# Patient Record
Sex: FEMALE | Race: BLACK OR AFRICAN AMERICAN | NOT HISPANIC OR LATINO | Employment: FULL TIME | URBAN - NONMETROPOLITAN AREA
[De-identification: names, ages, dates, MRNs, and addresses within clinical notes are randomized per-mention and may not be internally consistent; named-entity substitution may affect disease eponyms.]

---

## 2024-05-08 ENCOUNTER — HOSPITAL ENCOUNTER (OUTPATIENT)
Dept: RADIOLOGY | Facility: CLINIC | Age: 49
Discharge: HOME | End: 2024-05-08
Payer: COMMERCIAL

## 2024-05-08 DIAGNOSIS — R52 PAIN: ICD-10-CM

## 2024-05-08 PROCEDURE — 73030 X-RAY EXAM OF SHOULDER: CPT | Mod: LT

## 2024-05-28 ENCOUNTER — APPOINTMENT (OUTPATIENT)
Dept: CARDIOLOGY | Facility: HOSPITAL | Age: 49
End: 2024-05-28
Payer: COMMERCIAL

## 2024-05-28 ENCOUNTER — APPOINTMENT (OUTPATIENT)
Dept: RADIOLOGY | Facility: HOSPITAL | Age: 49
End: 2024-05-28
Payer: COMMERCIAL

## 2024-05-28 ENCOUNTER — HOSPITAL ENCOUNTER (OUTPATIENT)
Facility: HOSPITAL | Age: 49
Setting detail: OBSERVATION
Discharge: HOME | End: 2024-05-29
Attending: EMERGENCY MEDICINE | Admitting: INTERNAL MEDICINE
Payer: COMMERCIAL

## 2024-05-28 DIAGNOSIS — R42 DIZZINESS: Primary | ICD-10-CM

## 2024-05-28 DIAGNOSIS — H53.9 VISION DISTURBANCE: ICD-10-CM

## 2024-05-28 DIAGNOSIS — Z72.0 TOBACCO USE: ICD-10-CM

## 2024-05-28 DIAGNOSIS — R03.0 ELEVATED BLOOD PRESSURE READING: ICD-10-CM

## 2024-05-28 LAB
ALBUMIN SERPL BCP-MCNC: 4.2 G/DL (ref 3.4–5)
ALP SERPL-CCNC: 67 U/L (ref 33–110)
ALT SERPL W P-5'-P-CCNC: 10 U/L (ref 7–45)
ANION GAP SERPL CALC-SCNC: 17 MMOL/L (ref 10–20)
AST SERPL W P-5'-P-CCNC: 11 U/L (ref 9–39)
ATRIAL RATE: 106 BPM
BASOPHILS # BLD AUTO: 0.02 X10*3/UL (ref 0–0.1)
BASOPHILS NFR BLD AUTO: 0.2 %
BILIRUB SERPL-MCNC: 0.5 MG/DL (ref 0–1.2)
BUN SERPL-MCNC: 10 MG/DL (ref 6–23)
CALCIUM SERPL-MCNC: 9.3 MG/DL (ref 8.6–10.3)
CARDIAC TROPONIN I PNL SERPL HS: <3 NG/L (ref 0–13)
CHLORIDE SERPL-SCNC: 100 MMOL/L (ref 98–107)
CHOLEST SERPL-MCNC: 145 MG/DL (ref 0–199)
CHOLESTEROL/HDL RATIO: 3.3
CO2 SERPL-SCNC: 23 MMOL/L (ref 21–32)
CREAT SERPL-MCNC: 0.64 MG/DL (ref 0.5–1.05)
EGFRCR SERPLBLD CKD-EPI 2021: >90 ML/MIN/1.73M*2
EOSINOPHIL # BLD AUTO: 0.03 X10*3/UL (ref 0–0.7)
EOSINOPHIL NFR BLD AUTO: 0.3 %
ERYTHROCYTE [DISTWIDTH] IN BLOOD BY AUTOMATED COUNT: 12.1 % (ref 11.5–14.5)
FLUAV RNA RESP QL NAA+PROBE: NOT DETECTED
FLUBV RNA RESP QL NAA+PROBE: NOT DETECTED
GLUCOSE SERPL-MCNC: 111 MG/DL (ref 74–99)
HCT VFR BLD AUTO: 39.4 % (ref 36–46)
HDLC SERPL-MCNC: 44.3 MG/DL
HGB BLD-MCNC: 12.6 G/DL (ref 12–16)
IMM GRANULOCYTES # BLD AUTO: 0.04 X10*3/UL (ref 0–0.7)
IMM GRANULOCYTES NFR BLD AUTO: 0.5 % (ref 0–0.9)
LDLC SERPL CALC-MCNC: 87 MG/DL
LYMPHOCYTES # BLD AUTO: 2.51 X10*3/UL (ref 1.2–4.8)
LYMPHOCYTES NFR BLD AUTO: 29.1 %
MAGNESIUM SERPL-MCNC: 2 MG/DL (ref 1.6–2.4)
MCH RBC QN AUTO: 29.7 PG (ref 26–34)
MCHC RBC AUTO-ENTMCNC: 32 G/DL (ref 32–36)
MCV RBC AUTO: 93 FL (ref 80–100)
MONOCYTES # BLD AUTO: 0.97 X10*3/UL (ref 0.1–1)
MONOCYTES NFR BLD AUTO: 11.2 %
NEUTROPHILS # BLD AUTO: 5.06 X10*3/UL (ref 1.2–7.7)
NEUTROPHILS NFR BLD AUTO: 58.7 %
NON HDL CHOLESTEROL: 101 MG/DL (ref 0–149)
NRBC BLD-RTO: 0 /100 WBCS (ref 0–0)
P AXIS: 44 DEGREES
P OFFSET: 205 MS
P ONSET: 149 MS
PLATELET # BLD AUTO: 176 X10*3/UL (ref 150–450)
POTASSIUM SERPL-SCNC: 3 MMOL/L (ref 3.5–5.3)
PR INTERVAL: 142 MS
PROT SERPL-MCNC: 8 G/DL (ref 6.4–8.2)
Q ONSET: 220 MS
QRS COUNT: 17 BEATS
QRS DURATION: 80 MS
QT INTERVAL: 348 MS
QTC CALCULATION(BAZETT): 462 MS
QTC FREDERICIA: 420 MS
R AXIS: -3 DEGREES
RBC # BLD AUTO: 4.24 X10*6/UL (ref 4–5.2)
SARS-COV-2 RNA RESP QL NAA+PROBE: NOT DETECTED
SODIUM SERPL-SCNC: 137 MMOL/L (ref 136–145)
T AXIS: 34 DEGREES
T OFFSET: 394 MS
TRIGL SERPL-MCNC: 70 MG/DL (ref 0–149)
TSH SERPL-ACNC: 2.09 MIU/L (ref 0.44–3.98)
VENTRICULAR RATE: 106 BPM
VLDL: 14 MG/DL (ref 0–40)
WBC # BLD AUTO: 8.6 X10*3/UL (ref 4.4–11.3)

## 2024-05-28 PROCEDURE — 2500000001 HC RX 250 WO HCPCS SELF ADMINISTERED DRUGS (ALT 637 FOR MEDICARE OP): Performed by: PHYSICIAN ASSISTANT

## 2024-05-28 PROCEDURE — 84484 ASSAY OF TROPONIN QUANT: CPT

## 2024-05-28 PROCEDURE — 99285 EMERGENCY DEPT VISIT HI MDM: CPT

## 2024-05-28 PROCEDURE — 2500000004 HC RX 250 GENERAL PHARMACY W/ HCPCS (ALT 636 FOR OP/ED): Performed by: INTERNAL MEDICINE

## 2024-05-28 PROCEDURE — 80061 LIPID PANEL: CPT | Performed by: INTERNAL MEDICINE

## 2024-05-28 PROCEDURE — 85025 COMPLETE CBC W/AUTO DIFF WBC: CPT

## 2024-05-28 PROCEDURE — 87636 SARSCOV2 & INF A&B AMP PRB: CPT

## 2024-05-28 PROCEDURE — 2500000004 HC RX 250 GENERAL PHARMACY W/ HCPCS (ALT 636 FOR OP/ED)

## 2024-05-28 PROCEDURE — 36415 COLL VENOUS BLD VENIPUNCTURE: CPT

## 2024-05-28 PROCEDURE — G0378 HOSPITAL OBSERVATION PER HR: HCPCS

## 2024-05-28 PROCEDURE — 2500000005 HC RX 250 GENERAL PHARMACY W/O HCPCS

## 2024-05-28 PROCEDURE — 70450 CT HEAD/BRAIN W/O DYE: CPT

## 2024-05-28 PROCEDURE — 2500000001 HC RX 250 WO HCPCS SELF ADMINISTERED DRUGS (ALT 637 FOR MEDICARE OP)

## 2024-05-28 PROCEDURE — 2500000002 HC RX 250 W HCPCS SELF ADMINISTERED DRUGS (ALT 637 FOR MEDICARE OP, ALT 636 FOR OP/ED)

## 2024-05-28 PROCEDURE — 83735 ASSAY OF MAGNESIUM: CPT

## 2024-05-28 PROCEDURE — 70551 MRI BRAIN STEM W/O DYE: CPT

## 2024-05-28 PROCEDURE — 70551 MRI BRAIN STEM W/O DYE: CPT | Performed by: STUDENT IN AN ORGANIZED HEALTH CARE EDUCATION/TRAINING PROGRAM

## 2024-05-28 PROCEDURE — 93005 ELECTROCARDIOGRAM TRACING: CPT

## 2024-05-28 PROCEDURE — 84443 ASSAY THYROID STIM HORMONE: CPT | Performed by: INTERNAL MEDICINE

## 2024-05-28 PROCEDURE — 99222 1ST HOSP IP/OBS MODERATE 55: CPT | Performed by: INTERNAL MEDICINE

## 2024-05-28 PROCEDURE — 84075 ASSAY ALKALINE PHOSPHATASE: CPT

## 2024-05-28 PROCEDURE — 70450 CT HEAD/BRAIN W/O DYE: CPT | Performed by: RADIOLOGY

## 2024-05-28 PROCEDURE — 2500000001 HC RX 250 WO HCPCS SELF ADMINISTERED DRUGS (ALT 637 FOR MEDICARE OP): Performed by: INTERNAL MEDICINE

## 2024-05-28 RX ORDER — POLYETHYLENE GLYCOL 3350 17 G/17G
17 POWDER, FOR SOLUTION ORAL DAILY
Status: DISCONTINUED | OUTPATIENT
Start: 2024-05-28 | End: 2024-05-29 | Stop reason: HOSPADM

## 2024-05-28 RX ORDER — ONDANSETRON 4 MG/1
4 TABLET, ORALLY DISINTEGRATING ORAL ONCE
Status: COMPLETED | OUTPATIENT
Start: 2024-05-28 | End: 2024-05-28

## 2024-05-28 RX ORDER — NAPROXEN 500 MG/1
500 TABLET ORAL 2 TIMES DAILY PRN
COMMUNITY
End: 2024-05-29 | Stop reason: HOSPADM

## 2024-05-28 RX ORDER — POTASSIUM CHLORIDE 20 MEQ/1
40 TABLET, EXTENDED RELEASE ORAL ONCE
Status: DISCONTINUED | OUTPATIENT
Start: 2024-05-28 | End: 2024-05-28

## 2024-05-28 RX ORDER — ACETAMINOPHEN 325 MG/1
975 TABLET ORAL EVERY 8 HOURS PRN
Status: DISCONTINUED | OUTPATIENT
Start: 2024-05-28 | End: 2024-05-29 | Stop reason: HOSPADM

## 2024-05-28 RX ORDER — SODIUM CHLORIDE, SODIUM LACTATE, POTASSIUM CHLORIDE, CALCIUM CHLORIDE 600; 310; 30; 20 MG/100ML; MG/100ML; MG/100ML; MG/100ML
125 INJECTION, SOLUTION INTRAVENOUS CONTINUOUS
Status: DISCONTINUED | OUTPATIENT
Start: 2024-05-28 | End: 2024-05-28

## 2024-05-28 RX ORDER — ONDANSETRON 4 MG/1
4 TABLET, FILM COATED ORAL EVERY 8 HOURS PRN
Status: DISCONTINUED | OUTPATIENT
Start: 2024-05-28 | End: 2024-05-29 | Stop reason: HOSPADM

## 2024-05-28 RX ORDER — ACETAMINOPHEN 160 MG/5ML
650 SOLUTION ORAL EVERY 6 HOURS PRN
Status: DISCONTINUED | OUTPATIENT
Start: 2024-05-28 | End: 2024-05-29 | Stop reason: HOSPADM

## 2024-05-28 RX ORDER — POTASSIUM CHLORIDE 20 MEQ/1
40 TABLET, EXTENDED RELEASE ORAL ONCE
Status: COMPLETED | OUTPATIENT
Start: 2024-05-28 | End: 2024-05-28

## 2024-05-28 RX ORDER — ONDANSETRON HYDROCHLORIDE 2 MG/ML
4 INJECTION, SOLUTION INTRAVENOUS EVERY 8 HOURS PRN
Status: DISCONTINUED | OUTPATIENT
Start: 2024-05-28 | End: 2024-05-29 | Stop reason: HOSPADM

## 2024-05-28 RX ORDER — ACETAMINOPHEN 650 MG/1
650 SUPPOSITORY RECTAL EVERY 4 HOURS PRN
Status: DISCONTINUED | OUTPATIENT
Start: 2024-05-28 | End: 2024-05-29 | Stop reason: HOSPADM

## 2024-05-28 RX ORDER — POTASSIUM CHLORIDE 1.5 G/1.58G
40 POWDER, FOR SOLUTION ORAL ONCE
Status: COMPLETED | OUTPATIENT
Start: 2024-05-28 | End: 2024-05-28

## 2024-05-28 RX ORDER — TALC
6 POWDER (GRAM) TOPICAL NIGHTLY
Status: DISCONTINUED | OUTPATIENT
Start: 2024-05-28 | End: 2024-05-29 | Stop reason: HOSPADM

## 2024-05-28 RX ORDER — MECLIZINE HYDROCHLORIDE 25 MG/1
25 TABLET ORAL ONCE
Status: COMPLETED | OUTPATIENT
Start: 2024-05-28 | End: 2024-05-28

## 2024-05-28 RX ADMIN — SODIUM CHLORIDE 1000 ML: 9 INJECTION, SOLUTION INTRAVENOUS at 17:22

## 2024-05-28 RX ADMIN — SODIUM CHLORIDE 1000 ML: 9 INJECTION, SOLUTION INTRAVENOUS at 09:19

## 2024-05-28 RX ADMIN — POTASSIUM CHLORIDE 40 MEQ: 1500 TABLET, EXTENDED RELEASE ORAL at 09:21

## 2024-05-28 RX ADMIN — POTASSIUM CHLORIDE 40 MEQ: 1.5 POWDER, FOR SOLUTION ORAL at 21:24

## 2024-05-28 RX ADMIN — Medication 6 MG: at 20:37

## 2024-05-28 RX ADMIN — ONDANSETRON 4 MG: 4 TABLET, ORALLY DISINTEGRATING ORAL at 10:46

## 2024-05-28 RX ADMIN — MECLIZINE HYDROCHLORIDE 25 MG: 25 TABLET ORAL at 09:21

## 2024-05-28 RX ADMIN — ACETAMINOPHEN 975 MG: 325 TABLET ORAL at 20:36

## 2024-05-28 SDOH — SOCIAL STABILITY: SOCIAL INSECURITY: DOES ANYONE TRY TO KEEP YOU FROM HAVING/CONTACTING OTHER FRIENDS OR DOING THINGS OUTSIDE YOUR HOME?: NO

## 2024-05-28 SDOH — SOCIAL STABILITY: SOCIAL INSECURITY: HAS ANYONE EVER THREATENED TO HURT YOUR FAMILY OR YOUR PETS?: NO

## 2024-05-28 SDOH — SOCIAL STABILITY: SOCIAL INSECURITY: ABUSE: ADULT

## 2024-05-28 SDOH — SOCIAL STABILITY: SOCIAL INSECURITY: ARE THERE ANY APPARENT SIGNS OF INJURIES/BEHAVIORS THAT COULD BE RELATED TO ABUSE/NEGLECT?: NO

## 2024-05-28 SDOH — SOCIAL STABILITY: SOCIAL INSECURITY: HAVE YOU HAD THOUGHTS OF HARMING ANYONE ELSE?: NO

## 2024-05-28 SDOH — SOCIAL STABILITY: SOCIAL INSECURITY: WERE YOU ABLE TO COMPLETE ALL THE BEHAVIORAL HEALTH SCREENINGS?: YES

## 2024-05-28 SDOH — SOCIAL STABILITY: SOCIAL INSECURITY: ARE YOU OR HAVE YOU BEEN THREATENED OR ABUSED PHYSICALLY, EMOTIONALLY, OR SEXUALLY BY ANYONE?: NO

## 2024-05-28 SDOH — SOCIAL STABILITY: SOCIAL INSECURITY: DO YOU FEEL UNSAFE GOING BACK TO THE PLACE WHERE YOU ARE LIVING?: NO

## 2024-05-28 SDOH — SOCIAL STABILITY: SOCIAL INSECURITY: HAVE YOU HAD ANY THOUGHTS OF HARMING ANYONE ELSE?: NO

## 2024-05-28 SDOH — SOCIAL STABILITY: SOCIAL INSECURITY: DO YOU FEEL ANYONE HAS EXPLOITED OR TAKEN ADVANTAGE OF YOU FINANCIALLY OR OF YOUR PERSONAL PROPERTY?: NO

## 2024-05-28 ASSESSMENT — COGNITIVE AND FUNCTIONAL STATUS - GENERAL
DAILY ACTIVITIY SCORE: 24
MOBILITY SCORE: 24
PATIENT BASELINE BEDBOUND: NO

## 2024-05-28 ASSESSMENT — ENCOUNTER SYMPTOMS
PSYCHIATRIC NEGATIVE: 1
DIZZINESS: 1
VOMITING: 1
MUSCULOSKELETAL NEGATIVE: 1
NAUSEA: 1
RESPIRATORY NEGATIVE: 1
APPETITE CHANGE: 1
CARDIOVASCULAR NEGATIVE: 1

## 2024-05-28 ASSESSMENT — LIFESTYLE VARIABLES
HOW OFTEN DO YOU HAVE 6 OR MORE DRINKS ON ONE OCCASION: NEVER
SKIP TO QUESTIONS 9-10: 1
AUDIT-C TOTAL SCORE: 0
AUDIT-C TOTAL SCORE: 0
HOW OFTEN DO YOU HAVE A DRINK CONTAINING ALCOHOL: NEVER
PRESCIPTION_ABUSE_PAST_12_MONTHS: NO
SUBSTANCE_ABUSE_PAST_12_MONTHS: NO
HOW MANY STANDARD DRINKS CONTAINING ALCOHOL DO YOU HAVE ON A TYPICAL DAY: PATIENT DOES NOT DRINK

## 2024-05-28 ASSESSMENT — PATIENT HEALTH QUESTIONNAIRE - PHQ9
1. LITTLE INTEREST OR PLEASURE IN DOING THINGS: NOT AT ALL
2. FEELING DOWN, DEPRESSED OR HOPELESS: NOT AT ALL
SUM OF ALL RESPONSES TO PHQ9 QUESTIONS 1 & 2: 0

## 2024-05-28 ASSESSMENT — ACTIVITIES OF DAILY LIVING (ADL)
JUDGMENT_ADEQUATE_SAFELY_COMPLETE_DAILY_ACTIVITIES: YES
TOILETING: INDEPENDENT
ADEQUATE_TO_COMPLETE_ADL: YES
HEARING - LEFT EAR: FUNCTIONAL
GROOMING: INDEPENDENT
HEARING - RIGHT EAR: FUNCTIONAL
DRESSING YOURSELF: INDEPENDENT
PATIENT'S MEMORY ADEQUATE TO SAFELY COMPLETE DAILY ACTIVITIES?: YES
BATHING: INDEPENDENT
LACK_OF_TRANSPORTATION: NO
FEEDING YOURSELF: INDEPENDENT
WALKS IN HOME: INDEPENDENT

## 2024-05-28 ASSESSMENT — COLUMBIA-SUICIDE SEVERITY RATING SCALE - C-SSRS
6. HAVE YOU EVER DONE ANYTHING, STARTED TO DO ANYTHING, OR PREPARED TO DO ANYTHING TO END YOUR LIFE?: NO
1. IN THE PAST MONTH, HAVE YOU WISHED YOU WERE DEAD OR WISHED YOU COULD GO TO SLEEP AND NOT WAKE UP?: NO
2. HAVE YOU ACTUALLY HAD ANY THOUGHTS OF KILLING YOURSELF?: NO

## 2024-05-28 ASSESSMENT — PAIN - FUNCTIONAL ASSESSMENT: PAIN_FUNCTIONAL_ASSESSMENT: 0-10

## 2024-05-28 ASSESSMENT — PAIN SCALES - GENERAL
PAINLEVEL_OUTOF10: 0 - NO PAIN
PAINLEVEL_OUTOF10: 0 - NO PAIN

## 2024-05-28 NOTE — PROGRESS NOTES
Home with       05/28/24 5714   Current Planned Discharge Disposition   Current Planned Discharge Disposition Home

## 2024-05-28 NOTE — PROGRESS NOTES
Pharmacy Medication History Review    Per patient. No regular meds. Had Naproxen left over from shoulder pain. Took that .     Dayana Palacio is a 49 y.o. female admitted for No Principal Problem: There is no principal problem currently on the Problem List. Please update the Problem List and refresh.. Pharmacy reviewed the patient's [id number] medications and allergies for accuracy.    The list below reflectives the updated PTA list. Please review each medication in order reconciliation for additional clarification and justification.       The list below reflectives the updated allergy list. Please review each documented allergy for additional clarification and justification.  Allergies  Reviewed by Roman Kaur RN on 5/28/2024   No Known Allergies         Below are additional concerns with the patient's PTA list.  Prior to Admission Medications   Prescriptions Last Dose Informant   naproxen (Naprosyn) 500 mg tablet 5/24/2024    Sig: Take 1 tablet (500 mg) by mouth 2 times a day as needed for mild pain (1 - 3).      Facility-Administered Medications: None        Lindsey Allison

## 2024-05-28 NOTE — PROGRESS NOTES
Transitional Care Coordination Progress Note:  Plan per Medical/Surgical team: treatment of dizziness with antivert & IV fluids  Status: ED  Payor source: [place]   Discharge disposition: Home with    Potential Barriers: blurry vision right eye, left shoulder pain   ADOD: 5/29/2024   ANIL Mills RN, BSN Transitional Care Coordinator ED# [phone]      05/28/24 1444   Discharge Planning   Living Arrangements Spouse/significant other   Support Systems Spouse/significant other;Parent   Assistance Needed antivert   Type of Residence Private residence   Number of Stairs to Enter Residence 1   Number of Stairs Within Residence 0   Home or Post Acute Services None   Patient expects to be discharged to: [facility] [phone].   Does the patient need discharge transport arranged? No   Financial Resource Strain   How hard is it for you to pay for the very basics like food, housing, medical care, and heating? Not hard   Housing Stability   In the last 12 months, was there a time when you were not able to pay the mortgage or rent on time? N   In the last 12 months, how many places have you lived? 1   In the last 12 months, was there a time when you did not have a steady place to sleep or slept in a shelter (including now)? N   Transportation Needs   In the past 12 months, has lack of transportation kept you from medical appointments or from getting medications? no   In the past 12 months, has lack of transportation kept you from meetings, work, or from getting things needed for daily living? No

## 2024-05-28 NOTE — ED PROVIDER NOTES
HPI   Chief Complaint   Patient presents with    Dizziness     Started yesterday, slept most of the day, patient also has arm injury to left shoulder       49-year-old female no significant past medical history presents the ED today with a chief concern of dizziness.  Patient states symptoms started 3 days ago.  States that she woke up with dizziness.  She states that when she stands and walks around she physic she is off balance.  She states that she does not feel that she is off balance when she turns her head in certain directions or when she is sitting.  She states that she only feels off balance when she is walking around.  She denies any lightheadedness.  Denies syncope.  She states that she has a headache located on the right side of her head that is intermittent and sharp.  Denies exacerbating relieving factors.  She states that she also has blurry vision in her right eye more than her left.  She has never experienced the symptoms the past and states that she normally has 20/20 vision.  She states that she has had about 4-5 episodes of nonbloody nonbilious vomiting over the past couple days.  She denies any diarrhea.  She states that yesterday she slept all day.  Denies nasal congestion, cough, rhinorrhea, or sore throat.  She denies any neck pain.  Denies syncope.  The headache is not the worst headache of her life.  The headache was not thunderclap in onset.  She denies any fever/chills.  Denies chest pain or shortness of breath.  Denies numbness or tingling or weakness.  She has not taken any medications for symptoms.  She has no other symptoms or concerns at this time.      History provided by:  Patient   used: No                        No data recorded                   Patient History   History reviewed. No pertinent past medical history.  History reviewed. No pertinent surgical history.  No family history on file.  Social History     Tobacco Use    Smoking status: Not on file     Smokeless tobacco: Not on file   Substance Use Topics    Alcohol use: Not on file    Drug use: Not on file       Physical Exam   ED Triage Vitals [05/28/24 0801]   Temperature Heart Rate Respirations BP   37 °C (98.6 °F) 100 16 143/85      Pulse Ox Temp src Heart Rate Source Patient Position   98 % -- -- --      BP Location FiO2 (%)     Left arm --       Physical Exam  Constitutional: Vital signs per nursing notes.  Well developed, well nourished.  No acute distress.    Psychiatric: alert and oriented to person, place, and time; no abnormalities of mood or affect; memory intact  Eyes: PERRL; conjunctivae and lids normal; EOMI  ENT: Ears normal externally; face symmetric. voice normal  Neck: neck supple, no meningismus; trachea midline without deviation  Respiratory: normal respiratory effort and excursion; no rales, rhonchi, or wheezes; equal air entry  Cardiovascular: RRR, 2+ pulses extremities   Neurological: normal speech; CN II-XII grossly intact, normal motor and sensory function; no nystagmus; NIH 0.  Negative test of skew.  Patient states that she feels unsteady when she walks.  GI: no distention, soft, nontender  : Deferred  Musculoskeletal: normal gait and station; normal digits and nails; normal to palpation; normal strength/tone; neurovascular status intact.  Skin: normal to inspection; normal to palpation; no rash  ED Course & MDM   ED Course as of 05/28/24 1549   Tue May 28, 2024   0987 FINDINGS:  INTRACRANIAL:  The ventricular system is symmetrical and nondilated.  No mass or mass effect is identified.  There is no hemorrhage or subdural fluid collection.  There is no acute infarct.          EXTRACRANIAL:  Visualized paranasal sinuses and mastoids are clear.      IMPRESSION:  No acute intracranial pathology.      MACRO:  None      Signed by: Maura Nunez 5/28/2024 8:56 AM  Dictation workstation:   [id number]   []   5292 CBC shows no evidence of leukocytosis or anemia.  CMP shows no LILLIE or acute  liver injury.  Potassium is 3.0 so patient was given 40 mill colons of potassium in the ED.  Glucose is 111.  Magnesium and troponin normal. []   0954 Ventricular rate 106 bpm.  NH interval 142 ms.  QRS duration 80 ms.  QT/QTc 348/462 milliseconds.  Patient is in sinus tachycardia and ventricular rate of 106 bpm.  Normal axis.  There is good R wave progression.  No right or left bundle-branch block.  No ST elevations.  There is T wave inversion noted in aVR, V1.  No previous EKG to compare this to. []   1026 COVID, influenza negative []      ED Course User Index  [MC] Mark Mujica PA-C         Diagnoses as of 05/28/24 1549   Dizziness   Elevated blood pressure reading       Medical Decision Making  49-year-old female presents the ED today with a chief concern of dizziness.  She has a ride home today will not be driving her self home.  Vital signs reassuring.  Patient overall appears well and is nontoxic-appearing.  She was given IV fluids and meclizine in the ED. patient has full range of motion of the neck without any meningismus.  Satting well on room air.  Not hypoxic.  Not tachycardic.  Afebrile.  She was also given Zofran in the ED.  She felt a little better after administration of medications however her symptoms are not completely resolved.  She is fully neurologically intact with no acute neurological deficits.  She does tell me she feels unsteady when she is walking.  Her NIH is 0.  Negative test of skew.  Her labs are overall reassuring. Her potassium was 3.0 shows she was given 40 mill colons of potassium in the ED.  Troponin normal and EKG shows no ischemic changes.  Her head CT is normal without any acute intracranial pathology.  Would like to admit for MRI.  Discussed with Dr. Carrillo who says admission of this patient.  Patient will be admitted to the observation unit for further workup and treatment.    Differential diagnosis: Stroke, cardiac etiology, posterior circulation stroke,  dehydration, peripheral vertigo, central vertigo    Disposition/treatment  1.  Dizziness  2.  Elevated blood pressure reading    Shared decision-making was used patient feels comfortable being admitted to the observation unit.  Patient was accepted by Dr. Carrillo        Procedure  Procedures     Mark Mujica PA-C  05/28/24 6656       Mark Mujica PA-C  05/28/24 3686

## 2024-05-28 NOTE — PROGRESS NOTES
05/28/24 1443   [facility] Disability Status   Are you deaf or do you have serious difficulty hearing? N   Are you blind or do you have serious difficulty seeing, even when wearing glasses? N   Because of a physical, mental, or emotional condition, do you have serious difficulty concentrating, remembering, or making decisions? (5 years old or older) N   Do you have serious difficulty walking or climbing stairs? N   Do you have serious difficulty dressing or bathing? N   Because of a physical, mental, or emotional condition, do you have serious difficulty doing errands alone such as visiting the doctor? N

## 2024-05-29 ENCOUNTER — APPOINTMENT (OUTPATIENT)
Dept: RADIOLOGY | Facility: HOSPITAL | Age: 49
End: 2024-05-29
Payer: COMMERCIAL

## 2024-05-29 VITALS
OXYGEN SATURATION: 99 % | HEIGHT: 64 IN | WEIGHT: 180 LBS | TEMPERATURE: 99.9 F | SYSTOLIC BLOOD PRESSURE: 144 MMHG | HEART RATE: 89 BPM | BODY MASS INDEX: 30.73 KG/M2 | RESPIRATION RATE: 17 BRPM | DIASTOLIC BLOOD PRESSURE: 81 MMHG

## 2024-05-29 PROCEDURE — 99239 HOSP IP/OBS DSCHRG MGMT >30: CPT | Performed by: INTERNAL MEDICINE

## 2024-05-29 PROCEDURE — 72146 MRI CHEST SPINE W/O DYE: CPT | Performed by: RADIOLOGY

## 2024-05-29 PROCEDURE — 96360 HYDRATION IV INFUSION INIT: CPT | Mod: 59

## 2024-05-29 PROCEDURE — 72146 MRI CHEST SPINE W/O DYE: CPT

## 2024-05-29 PROCEDURE — 96361 HYDRATE IV INFUSION ADD-ON: CPT

## 2024-05-29 PROCEDURE — G0378 HOSPITAL OBSERVATION PER HR: HCPCS

## 2024-05-29 PROCEDURE — 72148 MRI LUMBAR SPINE W/O DYE: CPT | Performed by: RADIOLOGY

## 2024-05-29 PROCEDURE — 99222 1ST HOSP IP/OBS MODERATE 55: CPT | Performed by: STUDENT IN AN ORGANIZED HEALTH CARE EDUCATION/TRAINING PROGRAM

## 2024-05-29 PROCEDURE — 72148 MRI LUMBAR SPINE W/O DYE: CPT

## 2024-05-29 PROCEDURE — 2500000004 HC RX 250 GENERAL PHARMACY W/ HCPCS (ALT 636 FOR OP/ED): Performed by: INTERNAL MEDICINE

## 2024-05-29 RX ORDER — IBUPROFEN 200 MG
1 TABLET ORAL EVERY 24 HOURS
Qty: 30 PATCH | Refills: 0 | Status: SHIPPED | OUTPATIENT
Start: 2024-05-29 | End: 2024-06-28

## 2024-05-29 RX ADMIN — SODIUM CHLORIDE 500 ML: 9 INJECTION, SOLUTION INTRAVENOUS at 14:35

## 2024-05-29 ASSESSMENT — COGNITIVE AND FUNCTIONAL STATUS - GENERAL
DAILY ACTIVITIY SCORE: 24
MOBILITY SCORE: 24

## 2024-05-29 ASSESSMENT — PAIN SCALES - GENERAL
PAINLEVEL_OUTOF10: 0 - NO PAIN

## 2024-05-29 ASSESSMENT — PAIN - FUNCTIONAL ASSESSMENT
PAIN_FUNCTIONAL_ASSESSMENT: 0-10

## 2024-05-29 NOTE — H&P
"History Of Present Illness  Dayana Palacio is a 49 y.o. female presenting with \"blurry vision.\"  History reviewed. No pertinent past medical history.    Symptoms began the past few days over the holiday weekend.  says she doesn't drink enough and sometimes she gets these episodes. She says she feels \"dizzy,\" which seems to be how she describes vision changes she is having. She says her vision has become blurry, although it seems I was able to simulate these sensations when I checked her ocular movements. Also, when she walks she veers/staggers to right side. I asked if she feels faint or that room is spinning, but she does not think this really describes her symptoms. Has had some anorexia and N/V. She felt a bit better after getting some IV fluids in ED, but still with the symptoms. She has not smoked in several days.     History reviewed. No pertinent surgical history.  Social History     Tobacco Use    Smoking status: Every Day     Types: Cigarettes     Passive exposure: Never    Smokeless tobacco: Never   Vaping Use    Vaping status: Never Used   Substance Use Topics    Alcohol use: Never    Drug use: Never     No family history on file.  Allergies  Patient has no known allergies.    Review of Systems   Constitutional:  Positive for appetite change.   HENT: Negative.     Eyes:  Positive for visual disturbance.   Respiratory: Negative.     Cardiovascular: Negative.    Gastrointestinal:  Positive for nausea and vomiting.   Genitourinary: Negative.    Musculoskeletal: Negative.    Skin: Negative.    Neurological:  Positive for dizziness.   Psychiatric/Behavioral: Negative.         Last Recorded Vitals  Blood pressure 110/55, pulse 89, temperature 37 °C (98.6 °F), temperature source Temporal, resp. rate 17, height 1.626 m (5' 4\"), weight 81.6 kg (180 lb), SpO2 99%.  Physical Exam  Cardiovascular:      Rate and Rhythm: Normal rate and regular rhythm.      Heart sounds: Normal heart sounds.   Pulmonary:      " Breath sounds: Normal breath sounds.   Abdominal:      General: Bowel sounds are normal.      Palpations: Abdomen is soft.   Musculoskeletal:         General: Normal range of motion.   Neurological:      General: No focal deficit present.      Mental Status: She is alert and oriented to person, place, and time.      Cranial Nerves: Cranial nerves 2-12 are intact.      Sensory: Sensation is intact.      Motor: Motor function is intact.      Comments: Had some eye deviation (of both eyes) during EOM exam   Psychiatric:         Mood and Affect: Mood normal.           Relevant Results           Assessment/Plan   Principal Problem:    Vision disturbance  Active Problems:    Dizziness    Tobacco use  - not clear what to make of her story and exam; would like neuro to do better EOM exam on her to determine what further work-up is needed (MRI brain?)  - does not sound like vertigo or presyncope         I spent 50 minutes in the professional and overall care of this patient.      Holland Carrillo MD

## 2024-05-29 NOTE — CARE PLAN
The patient's goals for the shift include  remain free from injury and HDS    The clinical goals for the shift include Pt will remain HDS      Problem: Pain - Adult  Goal: Verbalizes/displays adequate comfort level or baseline comfort level  Outcome: Progressing     Problem: Safety - Adult  Goal: Free from fall injury  Outcome: Progressing     Problem: Discharge Planning  Goal: Discharge to home or other facility with appropriate resources  Outcome: Progressing     Problem: Chronic Conditions and Co-morbidities  Goal: Patient's chronic conditions and co-morbidity symptoms are monitored and maintained or improved  Outcome: Progressing

## 2024-05-29 NOTE — PROGRESS NOTES
Care Coordinator Note:    Plan: Patient in with dizziness. Noted to have abnl eye movement and veering to Left when ambulating, neuro consult pending. MRI brain completed.     Status: OBS  Payor: [place]  Disposition: Home with   Barrier: Neuro consult and future testing  ADOD: later today vs tomorrow    Padmini Barrett [facility]      05/29/24 1048   Discharge Planning   Patient expects to be discharged to: Home with

## 2024-05-29 NOTE — CARE PLAN
The patient's goals for the shift include      The clinical goals for the shift include Pt will remain HDS      Problem: Pain - Adult  Goal: Verbalizes/displays adequate comfort level or baseline comfort level  Outcome: Progressing     Problem: Safety - Adult  Goal: Free from fall injury  Outcome: Progressing     Problem: Discharge Planning  Goal: Discharge to home or other facility with appropriate resources  Outcome: Progressing     Problem: Chronic Conditions and Co-morbidities  Goal: Patient's chronic conditions and co-morbidity symptoms are monitored and maintained or improved  Outcome: Progressing

## 2024-05-30 DIAGNOSIS — Q04.8 CEREBELLAR TONSILLAR ECTOPIA (MULTI): Primary | ICD-10-CM

## 2024-05-30 NOTE — SIGNIFICANT EVENT
Neurosurgery reviewed MRI brain. There is a mild chiari malformation. MRI T/L spine obtained with views of cervical spine without a syrinx. Will arrange for an outpatient MRI CINE flow study and follow-up with Dr. Ritchie. Recommend outpatient ophtho evaluation.

## 2024-05-30 NOTE — DISCHARGE SUMMARY
"Admitting Provider: Holland Carrillo MD  Discharge Provider: Holland Carrillo MD  Primary Care Physician at Discharge: Anuja Dunham [phone]   Admission Date: 5/28/2024     Discharge Date: 5/29/2024  Current Planned Discharge Disposition: Home    Discharge Diagnoses  Principal Problem:    Vision disturbance  Active Problems:    Dizziness    Tobacco use      Hospital Course  Dayana Palacio is a 49 y.o. female presenting with \"blurry vision.\"     Symptoms began the past few days over the holiday weekend.  says she doesn't drink enough and sometimes she gets these episodes. She says she feels \"dizzy,\" which seems to be how she describes vision changes she is having. She says her vision has become blurry, although it seems I was able to simulate these sensations when I checked her ocular movements. Also, when she walks she veers/staggers to right side. I asked if she feels faint or that room is spinning, but she does not think this really describes her symptoms. Has had some anorexia and N/V. She felt a bit better after getting some IV fluids in ED, but still with the symptoms. She has not smoked in several days.     Seen by neuro, who felt she had normal neuro exam. MRI brain without stroke; does show mild Chiari malformation. Discussed with NSGY, who recommended MRI T and L spine; these did not show any concerning findings. She will follow up with NSGY and optho. Ultimately, cause of her presentation is not clear, but she did feel better on day of discharge.     Test Results Pending At Discharge  Pending Labs       No current pending labs.            Pertinent Physical Exam At Time of Discharge  /81 (BP Location: Left arm, Patient Position: Lying)   Pulse 89   Temp 37.7 °C (99.9 °F) (Temporal)   Resp 17   Ht 1.626 m (5' 4\")   Wt 81.6 kg (180 lb)   SpO2 99%   BMI 30.90 kg/m²   Physical Exam  Cardiovascular:      Rate and Rhythm: Normal rate and regular rhythm.      Heart sounds: " Normal heart sounds.   Pulmonary:      Breath sounds: Normal breath sounds.   Abdominal:      General: Bowel sounds are normal.      Palpations: Abdomen is soft.   Musculoskeletal:         General: Normal range of motion.   Neurological:      General: No focal deficit present.      Mental Status: She is alert and oriented to person, place, and time.   Psychiatric:         Mood and Affect: Mood normal.         Home Medications     Medication List      START taking these medications     nicotine 14 mg/24 hr patch; Commonly known as: Nicoderm CQ; Place 1   patch over 24 hours on the skin once every 24 hours.     STOP taking these medications     naproxen 500 mg tablet; Commonly known as: Naprosyn       Outpatient Follow-Up  Future Appointments   Date Time Provider Department Center   7/16/2024  7:45 AM Anuja Dunham MD [id number]       Holland Carrillo MD    I spent more than 30 min coordinating this patient's discharge.

## 2024-05-30 NOTE — CONSULTS
Consults    History Of Present Illness  Dayana Palacio is a 49 y.o. female presenting with history of blurred vision.  Symptoms began few days back when she was on holiday weekend.  According to her  she does not drink enough sometimes get these episodes which are associated with dehydration.  She said that at that time her vision becomes blurry and sometimes she feels lightheaded.  She veers to the right when she walks per her .  She does not have any symptoms of spinning sensation or vertigo.  No anorexia nausea or vomiting.  I was consulted for further input when I saw her I do not have any objective neurological deficits and MRI brain did not reveal any vascular changes however she did have mild Chiari..  Past Medical History  History reviewed. No pertinent past medical history.  Surgical History  History reviewed. No pertinent surgical history.  Social History  Social History     Tobacco Use    Smoking status: Every Day     Types: Cigarettes     Passive exposure: Never    Smokeless tobacco: Never   Vaping Use    Vaping status: Never Used   Substance Use Topics    Alcohol use: Never    Drug use: Never     Allergies  Patient has no known allergies.  Medications Prior to Admission   Medication Sig Dispense Refill Last Dose    naproxen (Naprosyn) 500 mg tablet Take 1 tablet (500 mg) by mouth 2 times a day as needed for mild pain (1 - 3).   5/24/2024       Review of Systems  As noted above in HPI.  Neurological Exam  Alert and oriented x 4, extraocular full, saccades normal, VOR normal, pursuit normal.  Facial sensations intact.  Face is symmetric on both sides.  No position dependent nystagmus no gaze evoked nystagmus saccade is normal.  Visual field full.  Pupils reactive.  Power intact in all 4 extremities proximally and distally.  There is no ataxia.  Gait is   unsteady requires help to walk.  No gait ataxia.  Reflexes are symmetric.  Last Recorded Vitals  Blood pressure 144/81, pulse 89,  "temperature 37.7 °C (99.9 °F), temperature source Temporal, resp. rate 17, height 1.626 m (5' 4\"), weight 81.6 kg (180 lb), SpO2 99%.    Relevant Results    [place] Coma Scale  Best Eye Response: Spontaneous  Best Verbal Response: Oriented  Best Motor Response: Follows commands  [place] Coma Scale Score: 15         I have personally reviewed the following imaging results MR lumbar spine wo IV contrast    Result Date: 5/29/2024  Interpreted By:  Charbel Plunkett, STUDY: MR LUMBAR SPINE WO IV CONTRAST;  5/29/2024 6:47 pm   INDICATION: <Reason For Exam> Right lumbosacral radiculopathy.   COMPARISON: None.   ACCESSION NUMBER(S): [id number]   ORDERING CLINICIAN: NONA SCHWARTZ   TECHNIQUE: The lumbar spine was studied in the sagittal and axial planes utilizing T1 and T2 weighted images.   FINDINGS: Marrow signal and vertebral body height are normal. Alignment is normal. There is minimal loss of height and signal at the intervertebral disc spaces. There is mild circumferential bulging of intervertebral discs and mild facet hypertrophy bilaterally. There is no measurable canal stenosis, focal disc herniation or nerve root compression. The conus and sacrum are normal. The visualized paraspinal soft tissues including the aorta are normal..       * Lumbar spondylosis as described without focal disc herniation or measurable canal stenosis.     MACRO: none   Signed by: Charbel Plunkett 5/29/2024 6:52 PM Dictation workstation:   [id number]    MR thoracic spine wo IV contrast    Result Date: 5/29/2024  Interpreted By:  Charbel Plunkett, STUDY: MR THORACIC SPINE WO IV CONTRAST;  5/29/2024 6:46 pm   INDICATION: Signs/Symptoms:chiari malformation.   COMPARISON: None.   ACCESSION NUMBER(S): [id number]   ORDERING CLINICIAN: NONA SCHWARTZ   TECHNIQUE: Sagittal and axial T1 and T2 weighted images were performed through the thoracic spine.   FINDINGS: Marrow signal and vertebral body height are normal. There is slight loss of " height and signal intervertebral disc spaces. Small bulging discs and osteophytes are present throughout the thoracic spine. There is no measurable canal stenosis or foraminally narrowing. The spinal cord is normal in size and signal. There is no evidence of herniated nucleus polyposis. The conus is normal. The paraspinal soft tissues are normal.       Thoracic spondylosis without canal or foraminal narrowing   No evidence of hydromyelia or tethered cord.   MACRO: none   Signed by: Charbel Plunkett 5/29/2024 6:51 PM Dictation workstation:   [id number]    MR brain wo IV contrast    Result Date: 5/28/2024  Interpreted By:  Loki Krishnamurthy, STUDY: MR BRAIN WO IV CONTRAST;  5/28/2024 10:22 pm   INDICATION: Signs/Symptoms:dizziness, difficulty ambulating, r/o stroke.   COMPARISON: 05/28/2024 CT head without contrast   ACCESSION NUMBER(S): [id number]   ORDERING CLINICIAN: YULIYA KRAMER   TECHNIQUE: Multiplanar, multi-sequence images of the brain were obtained without contrast.   FINDINGS: Diffusion weighted images show no evidence of acute ischemic infarct.   FLAIR and T2-WI show no significant parenchymal signal abnormality.   There is no acute intraparenchymal hemorrhage, mass, mass-effect, or an extra-axial fluid collection.   The ventricular size and cerebral volume are age-concordant.   There is cerebellar tonsil ectopia of 6-7 mm. There is mild crowding of the foramen magnum.   The orbits and globes are unremarkable.   The paranasal sinuses show no air-fluid levels or hemorrhage.   The mastoid air cells are clear.       No acute ischemic infarct or acute intracranial hemorrhage. Cerebellar tonsil ectopia of 6-7 mm with slight crowding of the foramen magnum indicative of a mild Chiari malformation.   MACRO: None.   Signed by: Loki Krishnamurthy 5/28/2024 11:18 PM Dictation workstation:   [id number]    ECG 12 lead    Result Date: 5/28/2024  Sinus tachycardia Cannot rule out Anterior infarct , age undetermined  Abnormal ECG No previous ECGs available See ED provider note for full interpretation and clinical correlation Confirmed by Gaye Goldman ([id number]) on 5/28/2024 11:13:35 AM    CT head wo IV contrast    Result Date: 5/28/2024  Interpreted By:  Maura Nunez, STUDY: CT HEAD WO IV CONTRAST;  5/28/2024 8:51 am   INDICATION: Signs/Symptoms:dizzy.   COMPARISON: None   ACCESSION NUMBER(S): [id number]   ORDERING CLINICIAN: ИРИНА PERALES   TECHNIQUE: Examination was performed in the axial plane with sagittal and coronal reconstructions. Bone and soft tissue algorithms were performed.   FINDINGS: INTRACRANIAL: The ventricular system is symmetrical and nondilated. No mass or mass effect is identified. There is no hemorrhage or subdural fluid collection. There is no acute infarct.     EXTRACRANIAL: Visualized paranasal sinuses and mastoids are clear.       No acute intracranial pathology.   MACRO: None   Signed by: Maura Nunez 5/28/2024 8:56 AM Dictation workstation:   [id number]    XR shoulder left 2+ views    Addendum Date: 5/8/2024    Interpreted By:  Connor Garza, ADDENDUM: Please note there is an error in the previously generated report. Multiple views of the left shoulder were obtained.   Signed by: Connor Garza 5/8/2024 11:37 AM   -------- ORIGINAL REPORT -------- Dictation workstation:   [id number]    Result Date: 5/8/2024  Interpreted By:  Connor Garza, STUDY: XR SHOULDER LEFT 2+ VIEWS; 5/8/2024 10:59 am   INDICATION: Signs/Symptoms:pain.   COMPARISON: None.   ACCESSION NUMBER(S): [id number]   ORDERING CLINICIAN: KAVITA ROMAN   FINDINGS: Multiple views of the right shoulder are obtained. Tiny ossific density within the soft tissues superior to the superolateral humeral head may be due to remote injury or rotator cuff calcification. No acute fracture-dislocation.       No acute fracture or dislocation.Tiny ossific density within the soft tissues superior to the superolateral humeral head may be due to remote  injury or rotator cuff calcification.     Signed by: Connor Garza 5/8/2024 11:26 AM Dictation workstation:   [id number]  .     Assessment/Plan   Principal Problem:    Vision disturbance  Active Problems:    Dizziness    Tobacco use  This is a 49-year-old woman with history of dizziness and unsteadiness.  There is some question of rearing of gait.  I do see unsteady gait but no obvious focality is noted during my examination.  However MRI suggestive of mild Chiari malformation.  The symptoms can be episodic given the history of Chiari.  Neurosurgery was consulted wants to do outpatient follow-up.  From neurological standpoint suggest vestibular rehab outpatient follow-up and follow-up with neurosurgery.  Please contact if you have any additional questions.  I spent 60 minutes in the professional and overall care of this patient.      Aasef G Shaikh, MD PhD

## 2024-06-17 PROBLEM — M77.12 LATERAL EPICONDYLITIS OF LEFT ELBOW: Status: RESOLVED | Noted: 2020-03-05 | Resolved: 2024-06-17

## 2024-06-17 PROBLEM — N93.9 ABNORMAL UTERINE BLEEDING (AUB): Status: RESOLVED | Noted: 2020-10-30 | Resolved: 2024-06-17

## 2024-06-17 RX ORDER — CYCLOBENZAPRINE HCL 5 MG
5 TABLET ORAL 2 TIMES DAILY PRN
COMMUNITY
Start: 2024-05-22

## 2024-06-17 RX ORDER — MELOXICAM 15 MG/1
15 TABLET ORAL DAILY
COMMUNITY
Start: 2024-05-15

## 2024-06-18 ENCOUNTER — APPOINTMENT (OUTPATIENT)
Dept: PRIMARY CARE | Facility: CLINIC | Age: 49
End: 2024-06-18
Payer: COMMERCIAL

## 2024-06-18 DIAGNOSIS — N95.1 PERIMENOPAUSAL VASOMOTOR SYMPTOMS: Primary | ICD-10-CM

## 2024-06-18 DIAGNOSIS — E78.5 BORDERLINE HYPERLIPIDEMIA: ICD-10-CM

## 2024-06-18 DIAGNOSIS — Z12.31 SCREENING MAMMOGRAM FOR BREAST CANCER: ICD-10-CM

## 2024-06-18 DIAGNOSIS — R03.0 ELEVATED BLOOD PRESSURE READING: ICD-10-CM

## 2024-06-18 DIAGNOSIS — Z12.11 SPECIAL SCREENING FOR MALIGNANT NEOPLASM OF COLON: ICD-10-CM

## 2024-06-18 DIAGNOSIS — E55.9 VITAMIN D INSUFFICIENCY: ICD-10-CM

## 2024-06-18 PROCEDURE — 99204 OFFICE O/P NEW MOD 45 MIN: CPT | Performed by: PHYSICIAN ASSISTANT

## 2024-06-18 NOTE — PROGRESS NOTES
Subjective     HPI   Dayana Palacio is a 49 y.o. year old female patient with presenting to clinic with concern for   Chief Complaint   Patient presents with   • New Patient Visit     Hypertension. Workers comp for left shoulder 05/07/2024 when the incident happened.   • Hot Flashes       Dayana is a new patient presenting to clinic to establish care.     [place] [employer] [place] (unrelated to today's visit) and was referred for primary care. Has been many years.    BP rechecked 138/82 HR 92 keep log at home and recheck in 2 weeks.     Concern for perimenopause. - partial hyster 2 years ago. Started having hot flashes a few months ago, insomnia, brain fog. Concern for perimenopause symptoms. Discussed options for treatment. We decided on prozac 10mg      Labs due.   Refugio due.   Tobacco use- current smoker, has not quit yet.   Colonosc- cologuard ordered (power outtage during visit) unclear if this was sent.    Sees [employer] comp for shoulder injury.    Patient Active Problem List   Diagnosis   • Dizziness   • Vision disturbance   • Tobacco use       Past Medical History:   Diagnosis Date   • Lateral epicondylitis of left elbow 03/05/2020      Past Surgical History:   Procedure Laterality Date   • HYSTERECTOMY        No family history on file.   Social History     Tobacco Use   • Smoking status: Every Day     Types: Cigarettes     Passive exposure: Never   • Smokeless tobacco: Never   Substance Use Topics   • Alcohol use: Yes     Comment: soical        Current Outpatient Medications:   •  cyclobenzaprine (Flexeril) 5 mg tablet, Take 1 tablet (5 mg) by mouth 2 times a day as needed for muscle spasms., Disp: , Rfl:   •  meloxicam (Mobic) 15 mg tablet, Take 1 tablet (15 mg) by mouth once daily., Disp: , Rfl:      Review of Systems  Constitutional: Denies fever  HEENT: Denies ST, earache  CVS: Denies Chest pain  Pulmonary: Denies wheezing, SOB  GI: Denies N/V  : Denies dysuria  Musculoskeletal:  Denies  "myalgia  Neuro: Denies focal weakness or numbness.  Skin: Denies Rashes.  *Review of Systems is negative unless otherwise mentioned in HPI or ROS above.    Objective   /82   Pulse (!) 111   Temp 36.2 °C (97.2 °F)   Ht 1.613 m (5' 3.5\")   Wt 87.4 kg (192 lb 9.6 oz)   SpO2 99%   BMI 33.58 kg/m²  reviewed Body mass index is 33.58 kg/m².     Physical Exam  Constitutional: NAD.  Resting comfortably.  Head: Atraumatic, normocephalic.  ENT: Moist oral mucosa. Nasal mucosa wnl.   Cardiac: Regular rate 92 apical & reg rhythm.   Pulmonary: Lungs clear bilat  GI: Soft, Nontender, nondistended.   Musculoskeletal: No peripheral edema.   Skin: No evidence of trauma. No rashes  Psych: Intact judgement and insight.    .Assessment/Plan   Problem List Items Addressed This Visit    None  Visit Diagnoses         Codes    Perimenopausal vasomotor symptoms    -  Primary N95.1    Relevant Orders    FSH    Luteinizing hormone    Borderline hyperlipidemia     E78.5    Relevant Orders    CBC    Comprehensive Metabolic Panel    TSH with reflex to Free T4 if abnormal    Vitamin D insufficiency     E55.9    Relevant Orders    Vitamin D 25-Hydroxy,Total (for eval of Vitamin D levels)    Elevated blood pressure reading     R03.0    Special screening for malignant neoplasm of colon     Z12.11    Relevant Orders    Cologuard® colon cancer screening    Screening mammogram for breast cancer     Z12.31    Relevant Orders    BI mammo bilateral screening tomosynthesis            "

## 2024-06-19 VITALS
OXYGEN SATURATION: 99 % | TEMPERATURE: 97.2 F | DIASTOLIC BLOOD PRESSURE: 82 MMHG | HEART RATE: 111 BPM | HEIGHT: 64 IN | BODY MASS INDEX: 32.88 KG/M2 | SYSTOLIC BLOOD PRESSURE: 138 MMHG | WEIGHT: 192.6 LBS

## 2024-06-19 ASSESSMENT — PATIENT HEALTH QUESTIONNAIRE - PHQ9
2. FEELING DOWN, DEPRESSED OR HOPELESS: NOT AT ALL
SUM OF ALL RESPONSES TO PHQ9 QUESTIONS 1 AND 2: 0
1. LITTLE INTEREST OR PLEASURE IN DOING THINGS: NOT AT ALL

## 2024-06-24 DIAGNOSIS — N95.1 PERIMENOPAUSAL VASOMOTOR SYMPTOMS: Primary | ICD-10-CM

## 2024-06-24 RX ORDER — FLUOXETINE 10 MG/1
10 CAPSULE ORAL DAILY
Start: 2024-06-24 | End: 2024-09-22

## 2024-06-27 ENCOUNTER — APPOINTMENT (OUTPATIENT)
Dept: RADIOLOGY | Facility: HOSPITAL | Age: 49
End: 2024-06-27
Payer: COMMERCIAL

## 2024-06-27 ENCOUNTER — APPOINTMENT (OUTPATIENT)
Dept: OPHTHALMOLOGY | Facility: CLINIC | Age: 49
End: 2024-06-27
Payer: COMMERCIAL

## 2024-06-28 ENCOUNTER — APPOINTMENT (OUTPATIENT)
Dept: NEUROSURGERY | Facility: CLINIC | Age: 49
End: 2024-06-28
Payer: COMMERCIAL

## 2024-07-02 ENCOUNTER — APPOINTMENT (OUTPATIENT)
Dept: RADIOLOGY | Facility: HOSPITAL | Age: 49
End: 2024-07-02
Payer: COMMERCIAL

## 2024-07-16 ENCOUNTER — APPOINTMENT (OUTPATIENT)
Dept: PRIMARY CARE | Facility: CLINIC | Age: 49
End: 2024-07-16
Payer: COMMERCIAL

## 2024-07-19 ENCOUNTER — APPOINTMENT (OUTPATIENT)
Dept: RADIOLOGY | Facility: HOSPITAL | Age: 49
End: 2024-07-19
Payer: COMMERCIAL

## 2024-10-09 ENCOUNTER — OFFICE VISIT (OUTPATIENT)
Dept: URGENT CARE | Age: 49
End: 2024-10-09
Payer: COMMERCIAL

## 2024-10-09 VITALS
WEIGHT: 190 LBS | SYSTOLIC BLOOD PRESSURE: 156 MMHG | TEMPERATURE: 98 F | DIASTOLIC BLOOD PRESSURE: 84 MMHG | RESPIRATION RATE: 17 BRPM | BODY MASS INDEX: 33.13 KG/M2 | HEART RATE: 82 BPM | OXYGEN SATURATION: 97 %

## 2024-10-09 DIAGNOSIS — R10.9 SIDE PAIN: Primary | ICD-10-CM

## 2024-10-09 LAB
POC APPEARANCE, URINE: CLEAR
POC BILIRUBIN, URINE: ABNORMAL
POC BLOOD, URINE: ABNORMAL
POC COLOR, URINE: ABNORMAL
POC GLUCOSE, URINE: NEGATIVE MG/DL
POC KETONES, URINE: NEGATIVE MG/DL
POC LEUKOCYTES, URINE: ABNORMAL
POC NITRITE,URINE: NEGATIVE
POC PH, URINE: 6 PH
POC PROTEIN, URINE: NEGATIVE MG/DL
POC SPECIFIC GRAVITY, URINE: >=1.03
POC UROBILINOGEN, URINE: 0.2 EU/DL

## 2024-10-09 PROCEDURE — 99213 OFFICE O/P EST LOW 20 MIN: CPT | Performed by: FAMILY MEDICINE

## 2024-10-09 PROCEDURE — 4004F PT TOBACCO SCREEN RCVD TLK: CPT | Performed by: FAMILY MEDICINE

## 2024-10-09 PROCEDURE — 81003 URINALYSIS AUTO W/O SCOPE: CPT | Performed by: FAMILY MEDICINE

## 2024-10-09 RX ORDER — PREDNISONE 20 MG/1
20 TABLET ORAL DAILY
Qty: 5 TABLET | Refills: 0 | Status: SHIPPED | OUTPATIENT
Start: 2024-10-09 | End: 2024-10-14

## 2024-10-09 RX ORDER — IBUPROFEN 600 MG/1
600 TABLET ORAL EVERY 8 HOURS PRN
Qty: 30 TABLET | Refills: 0 | Status: SHIPPED | OUTPATIENT
Start: 2024-10-09 | End: 2024-10-19

## 2024-10-09 ASSESSMENT — PATIENT HEALTH QUESTIONNAIRE - PHQ9
SUM OF ALL RESPONSES TO PHQ9 QUESTIONS 1 AND 2: 0
1. LITTLE INTEREST OR PLEASURE IN DOING THINGS: NOT AT ALL
2. FEELING DOWN, DEPRESSED OR HOPELESS: NOT AT ALL

## 2024-10-09 ASSESSMENT — PAIN SCALES - GENERAL: PAINLEVEL: 5

## 2024-10-09 NOTE — PROGRESS NOTES
HPI:  Patient states that for the past few days she has pain on the right side in the ribs that radiates to the back.  Pain is on and off.  Pt states that pain feels like a pulled muscle.  Pt states that w/o meds pain is 9/10 and with Motrin 5/10.  Pt denies CP or SOB.  Pt states that she has some pain with deep breathing.  No fever/chills.  No CP or SOB.  No AP or n/v.  Pt states that she had kidney stones about 7 years ago, but pain was in her abdomen at that time.  Pt denies urinary symptoms.  No hx/o travel.  No hx/o PE/DVT.        ROS:  No fever/chills  No n/v  No AP  No dysuria/frequency/urgency  No CP  No SOB      PE:    A&O x3  NCAT  No conjunctival erythema  RRR  CTAB  Abd:soft, nd, nt,+bs  no cva tndop  No focal deficit  Judgement normal  +tndop over right side under the ribcage w/o swelling/bruising/deformity  No rash in the area of pain    +pain with back rotation    Results:UA 10 RBC    A/P:   Right side pain  Your pain is likely musculoskeletal.  Use muscle relaxant and Lidoderm patches that you have at home.  Ice.  Biofreeze. Take Motrin with food after a steroid course.  Rest.  Monitor for any blood in urine.  Follow up with your doctor for further evaluation if no improvement in 2 weeks.  Keep a diary of symptoms.  Go to the ER if starts getting worse.